# Patient Record
Sex: MALE | Race: WHITE
[De-identification: names, ages, dates, MRNs, and addresses within clinical notes are randomized per-mention and may not be internally consistent; named-entity substitution may affect disease eponyms.]

---

## 2019-06-28 ENCOUNTER — HOSPITAL ENCOUNTER (EMERGENCY)
Dept: HOSPITAL 62 - ER | Age: 29
LOS: 1 days | Discharge: HOME | End: 2019-06-29
Payer: OTHER GOVERNMENT

## 2019-06-28 DIAGNOSIS — T78.2XXA: ICD-10-CM

## 2019-06-28 DIAGNOSIS — T63.421A: Primary | ICD-10-CM
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
## 2019-06-28 PROCEDURE — 99283 EMERGENCY DEPT VISIT LOW MDM: CPT

## 2019-06-28 NOTE — ER DOCUMENT REPORT
ED Allergic Reaction





- General


Chief Complaint: Allergic Reaction


Stated Complaint: POSSIBLE ALLERGIC REACTION


Time Seen by Provider: 06/28/19 22:35


Notes: 


Patient is a 29-year-old male that comes to the emergency department for chief 

complaint of allergic reaction.  Patient was bitten by ants while working 

outside in the grass, he had multiple bites on the feet, he states he started 

noticing swelling of the foot, ignored it, but then he broke out into a rash and

started swelling on his face.  He also reports slightly difficult breathing and 

sensation of swelling in the throat.  He called EMS, he was given first 0.5 mg 

and then 0.3 mg IM of epinephrine, 125 mg of Solu-Medrol, 150 mg of Zantac, and 

patient took 50 mg of Benadryl before EMS arrived.  Patient states she feels 

jittery but that the swelling of his face has resolved, rash has resolved, 

throat tightness and sensation of shortness of breath have resolved.  Patient 

denies any current symptoms.  Patient has had 2 different episodes of allergic 

reactions to bites.  He takes no daily medications, he is active duty, denies 

past medical history otherwise.  Tetanus up-to-date.





- Related Data


Allergies/Adverse Reactions: 


                                        





fire ant Allergy (Verified 06/28/19 23:30)


   











Past Medical History





- General


Information source: Patient





- Social History


Smoking Status: Never Smoker


Frequency of alcohol use: None


Drug Abuse: None


Lives with: Family


Family History: Reviewed & Not Pertinent





- Medical History


Medical History: Negative


Surgical Hx: Negative





- Immunizations


Immunizations up to date: Yes


Hx Diphtheria, Pertussis, Tetanus Vaccination: Yes





Review of Systems





- Review of Systems


Constitutional: No symptoms reported


EENT: See HPI


Cardiovascular: No symptoms reported


Respiratory: See HPI


Gastrointestinal: No symptoms reported


Genitourinary: No symptoms reported


Male Genitourinary: No symptoms reported


Musculoskeletal: No symptoms reported


Skin: See HPI


Hematologic/Lymphatic: No symptoms reported


Neurological/Psychological: No symptoms reported





Physical Exam





- Vital signs


Vitals: 


                                        











Temp Pulse Resp BP Pulse Ox


 


 98.1 F   89   22 H  151/72 H  100 


 


 06/28/19 22:28  06/28/19 22:28  06/28/19 22:28  06/28/19 22:28  06/28/19 22:28














- Notes


Notes: 





GENERAL: Alert, interacts well. No acute distress.


HEAD: Normocephalic, atraumatic.


EYES: Pupils equal, round, and reactive to light. Extraocular movements intact.


ENT: Oral mucosa moist, tongue midline. Oropharynx unremarkable. Airway patent. 

Nares patent, no nasal septal hematoma.


NECK: Full range of motion. Supple. Trachea midline.


LUNGS: Clear to auscultation bilaterally, no wheezes, rales, or rhonchi. No res

piratory distress.


HEART: Regular rate and rhythm. No murmur


ABDOMEN: Soft, non-tender. Non-distended. Bowel sounds present in all 4 

quadrants.


GENITOURINARY: Deferred


EXTREMITIES: Moves all 4 extremities spontaneously. No edema, normal radial and 

dorsalis pedis pulses bilaterally. No cyanosis.


BACK: no cervical, thoracic, lumbar midline tenderness. No saddle anesthesia, 

normal distal neurovascular exam. Moves all extremities in full range of motion.


NEUROLOGICAL: Alert and oriented x3. Normal speech. Cranial nerves II through 

XII grossly intact. 


PSYCH: Normal affect, normal mood.


SKIN: Scattered ant bites over the feet/legs





Course





- Re-evaluation


Re-evalutation: 


On my evaluation patient symptoms have resolved.  He is slightly jittery but 

otherwise well-appearing.  He has already been fully medicated.  Patient will be

monitored and rechecked frequently.





On initial recheck patient sleeping but easily aroused.  No return of symptoms. 

We will continue to monitor.





Patient reevaluated again, still has not had any symptoms, is requesting to go 

home.  He will be monitored for approximately 4 hours and then discharged with 

medications and return precautions.





Patient reevaluated again, no new symptoms have developed.  Discharged with 

return precautions which were discussed in detail.  Patient and wife state 

understanding and agreement with plan.





- Vital Signs


Vital signs: 


                                        











Temp Pulse Resp BP Pulse Ox


 


 98 F   89   17   116/76   98 


 


 06/29/19 02:27  06/28/19 22:28  06/29/19 02:01  06/29/19 02:01  06/29/19 02:01














Discharge





- Discharge


Clinical Impression: 


 Allergy to ant bite





Anaphylaxis


Qualifiers:


 Encounter type: initial encounter Qualified Code(s): T78.2XXA - Anaphylactic 

shock, unspecified, initial encounter





Condition: Stable


Disposition: HOME, SELF-CARE


Additional Instructions: 


You have been treated for an anaphylactic allergic reaction that occurred 

tonight.


Take the prednisone as prescribed to completion.  Take the cetirizine as 

prescribed daily for the next week.


Follow-up with primary care.





In the event of return symptoms or severe allergic reaction symptoms (swelling 

of the face, lips, tongue, throat, difficulty breathing, etc), take the 

epinephrine pen and return immediately to the emergency department.


Prescriptions: 


Cetirizine HCl [24Hour Allergy] 10 mg PO DAILY #30 tablet


Epinephrine [Epipen 2-Juan] 0.3 mg IM ASDIR PRN #1 packet


 PRN Reason: 


Prednisone [Deltasone 10 mg Tablet] 10 mg PO ASDIR PRN #21 tablet


 PRN Reason:

## 2019-06-29 VITALS — SYSTOLIC BLOOD PRESSURE: 116 MMHG | DIASTOLIC BLOOD PRESSURE: 76 MMHG
